# Patient Record
Sex: MALE | Race: WHITE | NOT HISPANIC OR LATINO | URBAN - METROPOLITAN AREA
[De-identification: names, ages, dates, MRNs, and addresses within clinical notes are randomized per-mention and may not be internally consistent; named-entity substitution may affect disease eponyms.]

---

## 2021-08-13 ENCOUNTER — EMERGENCY (EMERGENCY)
Facility: HOSPITAL | Age: 31
LOS: 1 days | Discharge: HOME | End: 2021-08-13
Attending: EMERGENCY MEDICINE | Admitting: EMERGENCY MEDICINE
Payer: COMMERCIAL

## 2021-08-13 VITALS
DIASTOLIC BLOOD PRESSURE: 82 MMHG | WEIGHT: 154.98 LBS | TEMPERATURE: 99 F | RESPIRATION RATE: 18 BRPM | HEART RATE: 76 BPM | SYSTOLIC BLOOD PRESSURE: 135 MMHG | OXYGEN SATURATION: 98 %

## 2021-08-13 VITALS
OXYGEN SATURATION: 99 % | DIASTOLIC BLOOD PRESSURE: 78 MMHG | SYSTOLIC BLOOD PRESSURE: 131 MMHG | RESPIRATION RATE: 18 BRPM | HEART RATE: 74 BPM | TEMPERATURE: 97 F

## 2021-08-13 DIAGNOSIS — Z88.2 ALLERGY STATUS TO SULFONAMIDES: ICD-10-CM

## 2021-08-13 DIAGNOSIS — M41.9 SCOLIOSIS, UNSPECIFIED: ICD-10-CM

## 2021-08-13 DIAGNOSIS — R07.89 OTHER CHEST PAIN: ICD-10-CM

## 2021-08-13 LAB
ALBUMIN SERPL ELPH-MCNC: 4 G/DL — SIGNIFICANT CHANGE UP (ref 3.4–5)
ALP SERPL-CCNC: 53 U/L — SIGNIFICANT CHANGE UP (ref 40–120)
ALT FLD-CCNC: 45 U/L — HIGH (ref 12–42)
ANION GAP SERPL CALC-SCNC: 7 MMOL/L — LOW (ref 9–16)
AST SERPL-CCNC: 44 U/L — HIGH (ref 15–37)
BASOPHILS # BLD AUTO: 0.02 K/UL — SIGNIFICANT CHANGE UP (ref 0–0.2)
BASOPHILS NFR BLD AUTO: 0.3 % — SIGNIFICANT CHANGE UP (ref 0–2)
BILIRUB SERPL-MCNC: 0.5 MG/DL — SIGNIFICANT CHANGE UP (ref 0.2–1.2)
BUN SERPL-MCNC: 13 MG/DL — SIGNIFICANT CHANGE UP (ref 7–23)
CALCIUM SERPL-MCNC: 9.1 MG/DL — SIGNIFICANT CHANGE UP (ref 8.5–10.5)
CHLORIDE SERPL-SCNC: 107 MMOL/L — SIGNIFICANT CHANGE UP (ref 96–108)
CO2 SERPL-SCNC: 32 MMOL/L — HIGH (ref 22–31)
CREAT SERPL-MCNC: 1.2 MG/DL — SIGNIFICANT CHANGE UP (ref 0.5–1.3)
EOSINOPHIL # BLD AUTO: 0.04 K/UL — SIGNIFICANT CHANGE UP (ref 0–0.5)
EOSINOPHIL NFR BLD AUTO: 0.6 % — SIGNIFICANT CHANGE UP (ref 0–6)
GLUCOSE SERPL-MCNC: 101 MG/DL — HIGH (ref 70–99)
HCT VFR BLD CALC: 44.3 % — SIGNIFICANT CHANGE UP (ref 39–50)
HGB BLD-MCNC: 15.1 G/DL — SIGNIFICANT CHANGE UP (ref 13–17)
IMM GRANULOCYTES NFR BLD AUTO: 0.4 % — SIGNIFICANT CHANGE UP (ref 0–1.5)
LYMPHOCYTES # BLD AUTO: 1.31 K/UL — SIGNIFICANT CHANGE UP (ref 1–3.3)
LYMPHOCYTES # BLD AUTO: 19.1 % — SIGNIFICANT CHANGE UP (ref 13–44)
MCHC RBC-ENTMCNC: 30.9 PG — SIGNIFICANT CHANGE UP (ref 27–34)
MCHC RBC-ENTMCNC: 34.1 GM/DL — SIGNIFICANT CHANGE UP (ref 32–36)
MCV RBC AUTO: 90.6 FL — SIGNIFICANT CHANGE UP (ref 80–100)
MONOCYTES # BLD AUTO: 0.72 K/UL — SIGNIFICANT CHANGE UP (ref 0–0.9)
MONOCYTES NFR BLD AUTO: 10.5 % — SIGNIFICANT CHANGE UP (ref 2–14)
NEUTROPHILS # BLD AUTO: 4.74 K/UL — SIGNIFICANT CHANGE UP (ref 1.8–7.4)
NEUTROPHILS NFR BLD AUTO: 69.1 % — SIGNIFICANT CHANGE UP (ref 43–77)
NRBC # BLD: 0 /100 WBCS — SIGNIFICANT CHANGE UP (ref 0–0)
PLATELET # BLD AUTO: 213 K/UL — SIGNIFICANT CHANGE UP (ref 150–400)
POTASSIUM SERPL-MCNC: 4.6 MMOL/L — SIGNIFICANT CHANGE UP (ref 3.5–5.3)
POTASSIUM SERPL-SCNC: 4.6 MMOL/L — SIGNIFICANT CHANGE UP (ref 3.5–5.3)
PROT SERPL-MCNC: 7.3 G/DL — SIGNIFICANT CHANGE UP (ref 6.4–8.2)
RBC # BLD: 4.89 M/UL — SIGNIFICANT CHANGE UP (ref 4.2–5.8)
RBC # FLD: 11.6 % — SIGNIFICANT CHANGE UP (ref 10.3–14.5)
SODIUM SERPL-SCNC: 146 MMOL/L — HIGH (ref 132–145)
TROPONIN I SERPL-MCNC: <0.017 NG/ML — LOW (ref 0.02–0.06)
WBC # BLD: 6.86 K/UL — SIGNIFICANT CHANGE UP (ref 3.8–10.5)
WBC # FLD AUTO: 6.86 K/UL — SIGNIFICANT CHANGE UP (ref 3.8–10.5)

## 2021-08-13 PROCEDURE — 71046 X-RAY EXAM CHEST 2 VIEWS: CPT | Mod: 26

## 2021-08-13 PROCEDURE — 99284 EMERGENCY DEPT VISIT MOD MDM: CPT | Mod: 25

## 2021-08-13 PROCEDURE — 93010 ELECTROCARDIOGRAM REPORT: CPT | Mod: 59

## 2021-08-13 RX ADMIN — Medication 30 MILLILITER(S): at 17:24

## 2021-08-13 NOTE — ED PROVIDER NOTE - CHPI ED SYMPTOMS NEG
no back pain/no cough/no dizziness/no nausea/no shortness of breath/no syncope/no vomiting/no chills/no diaphoresis

## 2021-08-13 NOTE — ED PROVIDER NOTE - OBJECTIVE STATEMENT
30 year old male with a history of scoliosis came to the ED because of a sharp left sided chest pain that radiates down the left arm since yesterday. 2 years ago in [place] he had a similar incident, had an abnormal EKG and underwent testing which was normal. No sob, no fever, no nausea, no vomiting, no diaphoresis, no modifying factors, no back pain.

## 2021-08-13 NOTE — ED PROVIDER NOTE - CARE PROVIDER_API CALL
Dayron Cornejo)  Cardiovascular Disease  [address] [facility], [address] [place] [address]  Phone: [phone]  Fax: [phone]  Follow Up Time:

## 2021-08-13 NOTE — ED PROVIDER NOTE - PATIENT PORTAL LINK FT
You can access the FollowMyHealth Patient Portal offered by [facility] by registering at the following website: [email or web address]. By joining [facility]’s FollowMyHealth portal, you will also be able to view your health information using other applications (apps) compatible with our system.

## 2021-08-13 NOTE — ED PROVIDER NOTE - PROGRESS NOTE DETAILS
Pt was able to access his previous records from [facility] in [place], I accessed his EKG, deep twave inversion in II, III, Avf, v3,4,5,6 and he had normal workup after that.

## 2021-08-13 NOTE — ED PROVIDER NOTE - CLINICAL SUMMARY MEDICAL DECISION MAKING FREE TEXT BOX
30 year old male came to the ED because of chest pain, pt was able to locate previous ekg from [facility], and similar to today, pain over 24 hours and trop negative, improvement with Maalox, no other cardiac risk factors, will dc to follow up with cardio ASAP. Precautions reviewed.

## 2021-08-13 NOTE — ED ADULT NURSE NOTE - OBJECTIVE STATEMENT
c/o midsternal chest discomfort x 2 days, worsening with movement. +pt appears comfortable, in NAD and will continue to monitor. denies SOB, dizziness.

## 2021-08-17 PROBLEM — M41.9 SCOLIOSIS, UNSPECIFIED: Chronic | Status: ACTIVE | Noted: 2021-08-14

## 2021-08-17 PROBLEM — Z00.00 ENCOUNTER FOR PREVENTIVE HEALTH EXAMINATION: Status: ACTIVE | Noted: 2021-08-17

## 2021-08-17 PROBLEM — R94.31 ABNORMAL ELECTROCARDIOGRAM [ECG] [EKG]: Chronic | Status: ACTIVE | Noted: 2021-08-14

## 2021-08-20 ENCOUNTER — APPOINTMENT (OUTPATIENT)
Dept: HEART AND VASCULAR | Facility: CLINIC | Age: 31
End: 2021-08-20

## 2021-09-17 ENCOUNTER — TRANSCRIPTION ENCOUNTER (OUTPATIENT)
Age: 31
End: 2021-09-17

## 2021-12-27 ENCOUNTER — NON-APPOINTMENT (OUTPATIENT)
Age: 31
End: 2021-12-27

## 2021-12-28 ENCOUNTER — NON-APPOINTMENT (OUTPATIENT)
Age: 31
End: 2021-12-28

## 2021-12-28 ENCOUNTER — APPOINTMENT (OUTPATIENT)
Dept: OTOLARYNGOLOGY | Facility: CLINIC | Age: 31
End: 2021-12-28
Payer: COMMERCIAL

## 2021-12-28 VITALS
SYSTOLIC BLOOD PRESSURE: 115 MMHG | OXYGEN SATURATION: 95 % | DIASTOLIC BLOOD PRESSURE: 74 MMHG | HEART RATE: 74 BPM | TEMPERATURE: 96.6 F | BODY MASS INDEX: 22.19 KG/M2 | HEIGHT: 70 IN | WEIGHT: 155 LBS

## 2021-12-28 DIAGNOSIS — Z78.9 OTHER SPECIFIED HEALTH STATUS: ICD-10-CM

## 2021-12-28 PROCEDURE — 31231 NASAL ENDOSCOPY DX: CPT

## 2021-12-28 PROCEDURE — 99204 OFFICE O/P NEW MOD 45 MIN: CPT | Mod: 25

## 2021-12-28 RX ORDER — ISOPROPYL ALCOHOL 0.7 ML/1
70 SWAB TOPICAL
Refills: 0 | Status: ACTIVE | COMMUNITY

## 2021-12-28 NOTE — HISTORY OF PRESENT ILLNESS
[de-identified] : Lifelong difficulty w/ breathing through his L>>R nose that have not responded to long-term courses of nasal steroids. Nasal injury as a child. Chronic midfacial "tension" and some intermittent yellow nasal drainage; has 5-6 sinus infections/yr. \par Seasonal allergies that respond to OTC antihistamines. \par Mouth breaths but denies significant snoring or daytime sleepiness.

## 2021-12-28 NOTE — ASSESSMENT
[FreeTextEntry1] : Discussed allergen mitigation and provided the patient with the corresponding educational handout; reviewed proper nasal steroid administration technique.\par \par Discussed a range of options from septo to open SRP w/ spreaders & osteotomies; he will consider while we obtain a CT

## 2021-12-28 NOTE — PHYSICAL EXAM
[Nasal Endoscopy Performed] : nasal endoscopy was performed, see procedure section for findings [] : septum deviated to the left [Removed] : palatine tonsils previously removed [de-identified] : bony dev to the R; L>R pinched midvault; small hump, pos mod [place] bilat [Normal] : assessment of respiratory effort is normal

## 2021-12-28 NOTE — PROCEDURE
[FreeTextEntry6] : We discussed the elevated risk of liberation of viral particles from the nasopharynx if the patient were to be asymptomatically infected with COVID-19; after weighing the risks & benefits the decision was made to proceed. The procedure was performed while wearing appropriate PPE and a camera attached to a video system was used to maximize separation from the patient. The scope was handled appropriately. \par Indication: requirement for exam not possible via anterior rhinoscopy; chronic nasal obstruction, infections\par After verbal consent and the administration of a small amount of an aerosolized phenylephrine/lidocaine mix, examination was performed with a flexible endoscope. Findings:\par Septum: very severe L NSD impacting the IT\par Mucosa: normal\par Polyposis: not present\par Inferior turbinates: unremarkable\par Middle and superior turbinates: normal\par Inferior meatus: unremarkable\par Middle meatus: unremarkable\par Superior meatus: unremarkable\par Speno-ethmoidal recess: unremarkable\par Nasopharynx: unremarkable\par Secretions: unremarkable\par Other findings: none

## 2022-01-05 ENCOUNTER — OUTPATIENT (OUTPATIENT)
Dept: OUTPATIENT SERVICES | Facility: HOSPITAL | Age: 32
LOS: 1 days | End: 2022-01-05

## 2022-01-05 ENCOUNTER — RESULT REVIEW (OUTPATIENT)
Age: 32
End: 2022-01-05

## 2022-01-05 ENCOUNTER — NON-APPOINTMENT (OUTPATIENT)
Age: 32
End: 2022-01-05

## 2022-01-05 ENCOUNTER — APPOINTMENT (OUTPATIENT)
Dept: CT IMAGING | Facility: CLINIC | Age: 32
End: 2022-01-05
Payer: COMMERCIAL

## 2022-01-05 PROCEDURE — 70486 CT MAXILLOFACIAL W/O DYE: CPT | Mod: 26

## 2022-01-07 ENCOUNTER — APPOINTMENT (OUTPATIENT)
Dept: OTOLARYNGOLOGY | Facility: CLINIC | Age: 32
End: 2022-01-07

## 2022-01-10 ENCOUNTER — APPOINTMENT (OUTPATIENT)
Dept: OTOLARYNGOLOGY | Facility: CLINIC | Age: 32
End: 2022-01-10
Payer: COMMERCIAL

## 2022-01-10 DIAGNOSIS — J30.2 OTHER SEASONAL ALLERGIC RHINITIS: ICD-10-CM

## 2022-01-10 PROCEDURE — 99214 OFFICE O/P EST MOD 30 MIN: CPT | Mod: 95

## 2022-01-10 NOTE — DATA REVIEWED
[de-identified] : CT reviewed w/ pt: bilat lateralized mid turbs w/ R OMC obstruction; severe L>>R DNS and near-complete L max opac'n

## 2022-01-10 NOTE — HISTORY OF PRESENT ILLNESS
[de-identified] : Telehealth visit\par Visit initiated at the patient or guardian's request. This telehealth audio/visual visit is occurring during the state of emergency due to COVID-19. Governmental regulation is restricting travel and in-person contact and recommends the use of remote activities and telemedicine encounter whenever possible. I discussed with the patient the limitations of telemedicine encounters, including risks associated with the technology platform, technical difficulties, data security and a limited physical exam. There is also a limitation in performing diagnostic procedures and the patient may need further testing and workup to arrive at a diagnosis and treatment plan. We also discussed that this will be billed as an outpatient visit. The following named individual expressed understanding and elected to proceed: \par RAJWINDER JOSE FRANCISCO at:  4:30PM on 01/10/2022.\par Originating site: the worksite of the patient in [place], [place]. Participants included: RAJWINDER FELTON\par Distant site: the office of Dr. Escalante at [address] [place] [address]; this was the only participant at this location.\par \par Lifelong difficulty w/ breathing through his L>>R nose that have not responded to long-term courses of nasal steroids. Nasal injury as a child. Chronic midfacial "tension" and some intermittent yellow nasal drainage; has 5-6 sinus infections/yr and now f/u a sinus CT. Has some questions about a cosmetic rhinoplasty today as well. \par Seasonal allergies that respond to OTC antihistamines. \par Mouth breaths but denies significant snoring or daytime sleepiness.

## 2022-01-10 NOTE — PHYSICAL EXAM
[] : septum deviated to the left [de-identified] : Constitutional: alert & oriented\par Face/head: normocephalic & atraumatic\par Ears: normal external appearance.\par Neck: no visible lymphadenopathy or masses. Normal apparent range of motion. \par Eyes: no nystagmus or scleral icterus\par Neuro: CN 2-12 grossly intact\par Skin: exposed head & neck skin unremarkable..\par Resp: no dyspnea or coughing\par Psych: normal affect and appropriate linear thinking\par \par FP: nose w/ bony dev to the R; L>R pinched midvault; small hump, pos mod [place] bilat

## 2022-01-10 NOTE — ASSESSMENT
[FreeTextEntry1] : Discussed options again w/r to his nose in conjunction with likely bilat max antrostomies & anterior ethmoidectomies; he would like to review cosmetic options & provided him w/ Dr. Brennan's info to consider a joint procedure

## 2022-01-21 ENCOUNTER — APPOINTMENT (OUTPATIENT)
Dept: OTOLARYNGOLOGY | Facility: CLINIC | Age: 32
End: 2022-01-21
Payer: SELF-PAY

## 2022-01-21 ENCOUNTER — APPOINTMENT (OUTPATIENT)
Dept: OTOLARYNGOLOGY | Facility: CLINIC | Age: 32
End: 2022-01-21
Payer: COMMERCIAL

## 2022-01-21 VITALS — WEIGHT: 155 LBS | BODY MASS INDEX: 22.19 KG/M2 | TEMPERATURE: 96 F | HEIGHT: 70 IN

## 2022-01-21 PROCEDURE — 99213 OFFICE O/P EST LOW 20 MIN: CPT

## 2022-01-21 PROCEDURE — D0165 COSMETIC CONSULT: CPT

## 2022-01-21 RX ORDER — EMTRICITABINE AND TENOFOVIR DISOPROXIL FUMARATE 167; 250 MG/1; MG/1
TABLET, FILM COATED ORAL
Refills: 0 | Status: ACTIVE | COMMUNITY

## 2022-01-21 NOTE — ASSESSMENT
[FreeTextEntry1] : Pt with deviated septum and chronic rhinosinusitis, planning septoplasty and FESS with Dr Bar.  May benefit from nasal valve repair with bilateral  grafts, and ?right alar batten graft.  Would also consider narrowing medial crural footplates to further open external valve.  He was asked to try Breathe Rite nasal strips, as these can be predictive of who does well with nasal valve repair, and he will advise\par He may be interested in other options as well.

## 2022-01-21 NOTE — HISTORY OF PRESENT ILLNESS
[de-identified] : Pt seen by Dr. Bar, planning septoplasty, FESS for recurrent sinusitis and chronic nasal obstruction L>R that failed to respond to medical management with FLonase.  Had nasal trauma as a child, finds himself mouthbreathing and has difficulty sleeping and with exercise.  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

## 2022-02-07 ENCOUNTER — APPOINTMENT (OUTPATIENT)
Dept: OTOLARYNGOLOGY | Facility: CLINIC | Age: 32
End: 2022-02-07
Payer: COMMERCIAL

## 2022-02-07 ENCOUNTER — APPOINTMENT (OUTPATIENT)
Dept: OTOLARYNGOLOGY | Facility: CLINIC | Age: 32
End: 2022-02-07

## 2022-02-07 DIAGNOSIS — J34.89 OTHER SPECIFIED DISORDERS OF NOSE AND NASAL SINUSES: ICD-10-CM

## 2022-02-07 DIAGNOSIS — J32.9 CHRONIC SINUSITIS, UNSPECIFIED: ICD-10-CM

## 2022-02-07 DIAGNOSIS — J34.2 DEVIATED NASAL SEPTUM: ICD-10-CM

## 2022-02-07 DIAGNOSIS — M95.0 ACQUIRED DEFORMITY OF NOSE: ICD-10-CM

## 2022-02-07 PROCEDURE — 99213 OFFICE O/P EST LOW 20 MIN: CPT | Mod: 95

## 2022-02-07 NOTE — HISTORY OF PRESENT ILLNESS
[Home] : at home, [unfilled] , at the time of the visit. [Medical Office: ([facility])___] : at the medical office located in  [Verbal consent obtained from patient] : the patient, [unfilled] [de-identified] : Lifelong difficulty w/ breathing through his L>>R nose that have not responded to long-term courses of nasal steroids. Nasal injury as a child. Chronic midfacial "tension" and some intermittent yellow nasal drainage; has 5-6 sinus infections/yr and now f/u a sinus CT. Has some questions about a cosmetic rhinoplasty today as well. \par Seasonal allergies that respond to OTC antihistamines. \par Mouth breaths but denies significant snoring or daytime sleepiness. \par He now presents to discuss the timing & details of a joint surgery between myself & Dr. Brennan.

## 2022-02-07 NOTE — DATA REVIEWED
[de-identified] : CT sinus 1/22: bilat lateralized mid turbs w/ R OMC obstruction; severe L>>R DNS and near-complete L max opac'n

## 2022-02-07 NOTE — ASSESSMENT
[FreeTextEntry1] : Discussed possible surgery at length & answered questions. He will get back to us w/ timing

## 2024-09-23 ENCOUNTER — APPOINTMENT (OUTPATIENT)
Dept: ORTHOPEDIC SURGERY | Facility: CLINIC | Age: 34
End: 2024-09-23

## 2025-03-24 NOTE — ED PROVIDER NOTE - IV ALTEPASE ADMIN HIDDEN
Pt is A&Ox4. Pt denies chest pain, pt denies SOB, pt denies palpitations. All VSS, except pt BP is 160/84mmHg.
show